# Patient Record
Sex: MALE | Race: WHITE
[De-identification: names, ages, dates, MRNs, and addresses within clinical notes are randomized per-mention and may not be internally consistent; named-entity substitution may affect disease eponyms.]

---

## 2019-01-05 ENCOUNTER — HOSPITAL ENCOUNTER (EMERGENCY)
Dept: HOSPITAL 25 - ED | Age: 19
Discharge: LEFT BEFORE DISCHARGE | End: 2019-01-05
Payer: COMMERCIAL

## 2019-01-05 VITALS — SYSTOLIC BLOOD PRESSURE: 139 MMHG | DIASTOLIC BLOOD PRESSURE: 66 MMHG

## 2019-01-05 DIAGNOSIS — R11.2: Primary | ICD-10-CM

## 2019-01-05 DIAGNOSIS — R10.9: ICD-10-CM

## 2019-01-05 PROCEDURE — 99282 EMERGENCY DEPT VISIT SF MDM: CPT

## 2019-01-05 NOTE — ED
Abdominal Pain/Male





- HPI Summary


HPI Summary: 


Patient is a 17 y/o M presenting to ED with complaints of abdominal pain, N/V. 

Provider in to see patient at 1110 in waiting room. Upon evaluation, patient is 

hunched over, hiccuping and retching. He claims that he came to ED to be taken 

to [facility] and states that he does not want to be seen here at [facility] as 

[facility] does not take his insurance. Patient was reassured that all patients and 

all insurances are accepted. On triage, it is noted that patient was seen at 

[facility] 2 weeks ago for similar complaints and there was nothing 

found to be wrong with the patient. Triage nurse Kedar reports that patient was 

advised to stop smoking marijuana and drinking alcohol. However, patient admits 

to drinking alcohol and smoking marijuana last night. On triage, pain is rated 

10/10. Home medications and allergies are reviewed. 





 Allergies











Allergy/AdvReac Type Severity Reaction Status Date / Time


 


No Known Allergies Allergy   Verified 01/05/19 10:51














- History of Current Complaint


Chief Complaint: EDAbdPain


Stated Complaint: VOMITING


Hx Obtained From: Patient, Medical Records, Other: - nurse Kedar


Onset/Duration: Still Present


Timing: Constant


Severity Currently: Severe - 10/10


Pain Intensity: 10


Pain Scale Used: 0-10 Numeric - 10/10


Aggravating Factor(s): Nothing


Alleviating Factor(s): Nothing


Associated Signs And Symptoms: Positive: Nausea, Vomiting





- Allergies/Home Medications


Allergies/Adverse Reactions: 


 Allergies











Allergy/AdvReac Type Severity Reaction Status Date / Time


 


No Known Allergies Allergy   Verified 01/05/19 10:51














PMH/Surg Hx/FS Hx/Imm Hx


Sensory History: 


   Denies: Hx Legally Blind, Hx Deafness


Opthamlomology History: 


   Denies: Hx Legally Blind


EENT History: 


   Denies: Hx Deafness


Infectious Disease History: No


Infectious Disease History: 


   Denies: Traveled Outside the [place] in Last 30 Days





- Family History


Known Family History: Positive: Unknown - patient left AMA before gathering 





- Social History


Substance Use Type: Reports: Marijuana





- Additional Comments


History Additional Comments: 





patient left AMA before completing Hx 





Review of Systems


Negative: Fever - on vitals, temp is 97.8 F 


Positive: Abdominal Pain, Vomiting, Nausea


All Other Systems Reviewed And Are Negative: Yes





Physical Exam





- Summary


Physical Exam Summary: 


Appearance: Ill-appearing, moderate pain distress, well-nourished, hunching over

, retching, hiccuping


Skin: Warm, color reflects adequate perfusion, dry


Head: Normal Head/Face inspection, atraumatic


Eyes: Conjunctiva clear


ENT: Normal inspection


Neck: Supple, no nodes, no JVD


Respiratory: Lungs clear, normal breath sounds, no respiratory distress


Cardio: RRR, No murmur, pulses normal, brisk capillary refill


Abdomen: Soft, nontender


Bowel sounds: Present 


Musculoskeletal: Strength Intact/ROM intact, no calf tenderness, no edema.


Psychological: Normal


Neuro: Alert, muscle tone normal, no focal deficit








Triage Information Reviewed: Yes


Vital Signs On Initial Exam: 


 Initial Vitals











Temp Pulse Resp BP Pulse Ox


 


 97.8 F   88   20   139/66   100 


 


 01/05/19 10:47  01/05/19 10:47  01/05/19 10:47  01/05/19 10:47  01/05/19 10:47











Vital Signs Reviewed: Yes





Diagnostics





- Vital Signs


 Vital Signs











  Temp Pulse Resp BP Pulse Ox


 


 01/05/19 10:47  97.8 F  88  20  139/66  100














- Laboratory


Lab Statement: Any lab studies that have been ordered have been reviewed, and 

results considered in the medical decision making process.





Re-Evaluation





- Re-Evaluation


  ** First Eval


Re-Evaluation Time: 11:56


Comment: Patient has declined zofran, states that he is going to leave to nurse [facility]. This was advised against. Provider came to discuss the possible risks of 

leaving against medical advice. However, patient's parents had picked up 

patient at this point.





Abdominal Pain Fem Course/Dx





- Course


Course Of Treatment: Patient is a 17 y/o M presenting to ED with complaints of 

abdominal pain, N/V. Provider in to see patient at 1110 in waiting room. Upon 

evaluation, patient is hunched over, hiccuping and retching. He claims that he 

came to ED to be taken to [facility] and states that he does not want to be 

seen here at [facility] as [facility] does not take his insurance. Patient was reassured that 

all patients and all insurances are accepted. On triage, it is noted that 

patient was seen at [facility] 2 weeks ago for similar complaints and 

there was nothing found to be wrong with the patient. Triage nurse Kedar 

reports that patient was advised to stop smoking marijuana and drinking 

alcohol. However, patient admits to drinking alcohol and smoking marijuana last 

night.  On physical exam, patient is noted to be ill-appearing, hunched over, 

hiccuping, in moderate pain distress, and retching.  Fluids and Zofran ordered. 

Patient will be brought back when room is available.  1156 - In waiting room, 

patient had declined zofran, stated that he is going to leave to nurse Kedar. 

This was advised against. Provider came to discuss the possible risks of 

leaving against medical advice. However, patient's parents had picked up 

patient at this point.  Dx of vomiting, left AMA.





- Diagnoses


Provider Diagnoses: 


 Vomiting, Left against medical advice








Discharge





- Sign-Out/Discharge


Documenting (check all that apply): Patient Departure - LEFT AMA 





- Discharge Plan


Condition: Good


Disposition: AGAINST MEDICAL ADVICE


Referrals: 


Mehul Woods MD [Primary Care Provider] - 





- Attestation Statements


Document Initiated by Scribe: Yes


Documenting Scribe: SHAWNEE SANTIAGO 


Provider For Whom Scribe is Documenting (Include Credential): NISSA ADKINS MD


Scribe Attestation: 


SHAWNEE CARREON , scribed for NISSA ADKINS MD on 01/05/19 at 1550.